# Patient Record
Sex: MALE | URBAN - METROPOLITAN AREA
[De-identification: names, ages, dates, MRNs, and addresses within clinical notes are randomized per-mention and may not be internally consistent; named-entity substitution may affect disease eponyms.]

---

## 2021-07-08 ENCOUNTER — OFFICE VISIT (OUTPATIENT)
Dept: URBAN - METROPOLITAN AREA CLINIC 75 | Facility: CLINIC | Age: 56
End: 2021-07-08
Payer: COMMERCIAL

## 2021-07-08 DIAGNOSIS — H52.4 PRESBYOPIA: ICD-10-CM

## 2021-07-08 DIAGNOSIS — H35.033 HYPERTENSIVE RETINOPATHY, BILATERAL: Primary | ICD-10-CM

## 2021-07-08 PROCEDURE — 99204 OFFICE O/P NEW MOD 45 MIN: CPT | Performed by: OPTOMETRIST

## 2021-07-08 ASSESSMENT — VISUAL ACUITY
OD: 20/20
OS: 20/20

## 2021-07-08 ASSESSMENT — INTRAOCULAR PRESSURE
OS: 10
OD: 8

## 2022-10-20 ENCOUNTER — OFFICE VISIT (OUTPATIENT)
Dept: URBAN - METROPOLITAN AREA CLINIC 71 | Facility: CLINIC | Age: 57
End: 2022-10-20
Payer: COMMERCIAL

## 2022-10-20 DIAGNOSIS — H25.13 AGE-RELATED NUCLEAR CATARACT, BILATERAL: ICD-10-CM

## 2022-10-20 DIAGNOSIS — H04.123 DRY EYE SYNDROME OF BILATERAL LACRIMAL GLANDS: Primary | ICD-10-CM

## 2022-10-20 DIAGNOSIS — H35.033 HYPERTENSIVE RETINOPATHY, BILATERAL: ICD-10-CM

## 2022-10-20 DIAGNOSIS — H52.4 PRESBYOPIA: ICD-10-CM

## 2022-10-20 PROCEDURE — 92012 INTRM OPH EXAM EST PATIENT: CPT

## 2022-10-20 ASSESSMENT — INTRAOCULAR PRESSURE
OS: 18
OD: 17

## 2022-10-20 ASSESSMENT — VISUAL ACUITY
OD: 20/20
OS: 20/20

## 2022-10-20 NOTE — IMPRESSION/PLAN
Impression: Hypertensive retinopathy, bilateral: H35.033 Plan: Discussed DX and today's testing with pt. Educated/Stressed to pt on importance of BP control with monitoring, remaining compliant with medication regiment, and continuing follow up care with PCP regularly. Explained to pt that signs of Hypertension, such as AV nicking and tortuosity, will continue to be present but if BP does not stay under control/stable it can make condition worse. Recommend yearly DFE with [employer] for monitoring Pt voiced understanding

## 2023-12-04 ENCOUNTER — OFFICE VISIT (OUTPATIENT)
Dept: URBAN - METROPOLITAN AREA CLINIC 71 | Facility: CLINIC | Age: 58
End: 2023-12-04
Payer: COMMERCIAL

## 2023-12-04 DIAGNOSIS — H52.4 PRESBYOPIA: ICD-10-CM

## 2023-12-04 DIAGNOSIS — H35.033 HYPERTENSIVE RETINOPATHY, BILATERAL: ICD-10-CM

## 2023-12-04 DIAGNOSIS — H25.13 AGE-RELATED NUCLEAR CATARACT, BILATERAL: Primary | ICD-10-CM

## 2023-12-04 PROCEDURE — 99213 OFFICE O/P EST LOW 20 MIN: CPT

## 2023-12-04 ASSESSMENT — INTRAOCULAR PRESSURE
OD: 17
OS: 19

## 2023-12-04 ASSESSMENT — VISUAL ACUITY
OS: 20/20
OD: 20/20